# Patient Record
Sex: FEMALE | Race: WHITE | NOT HISPANIC OR LATINO
[De-identification: names, ages, dates, MRNs, and addresses within clinical notes are randomized per-mention and may not be internally consistent; named-entity substitution may affect disease eponyms.]

---

## 2017-07-25 PROBLEM — Z00.00 ENCOUNTER FOR PREVENTIVE HEALTH EXAMINATION: Status: ACTIVE | Noted: 2017-07-25

## 2017-08-01 ENCOUNTER — APPOINTMENT (OUTPATIENT)
Dept: GASTROENTEROLOGY | Facility: CLINIC | Age: 22
End: 2017-08-01

## 2018-01-16 ENCOUNTER — APPOINTMENT (OUTPATIENT)
Dept: INTERNAL MEDICINE | Facility: CLINIC | Age: 23
End: 2018-01-16

## 2019-03-04 ENCOUNTER — APPOINTMENT (OUTPATIENT)
Dept: PLASTIC SURGERY | Facility: CLINIC | Age: 24
End: 2019-03-04
Payer: COMMERCIAL

## 2019-03-04 VITALS — HEIGHT: 66 IN | WEIGHT: 110 LBS | BODY MASS INDEX: 17.68 KG/M2

## 2019-03-04 DIAGNOSIS — Z78.9 OTHER SPECIFIED HEALTH STATUS: ICD-10-CM

## 2019-03-04 DIAGNOSIS — E06.3 AUTOIMMUNE THYROIDITIS: ICD-10-CM

## 2019-03-04 DIAGNOSIS — D49.89 NEOPLASM OF UNSPECIFIED BEHAVIOR OF OTHER SPECIFIED SITES: ICD-10-CM

## 2019-03-04 DIAGNOSIS — K29.70 GASTRITIS, UNSPECIFIED, W/OUT BLEEDING: ICD-10-CM

## 2019-03-04 PROCEDURE — 99201 OFFICE OUTPATIENT NEW 10 MINUTES: CPT

## 2019-03-04 RX ORDER — BACILLUS COAGULANS/INULIN 1B-250 MG
CAPSULE ORAL
Refills: 0 | Status: ACTIVE | COMMUNITY

## 2019-03-04 NOTE — HISTORY OF PRESENT ILLNESS
[FreeTextEntry1] : 22 yo female presents for consultation for right upper extremity neoplasm.  Patient states she noticed the mass approximately 4 months ago.  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  She states the mass causes itchiness and discomfort.  She also feels a new mass on her upper left arm and the back of her left thigh.  She has not undergone any imaging or biopsy.  She has autoimmune thyroiditis, gastritis, and PCOS for which she takes probiotics and supplements.  SHe has a hx of cholecystectomy.  She does not smoke.

## 2019-03-04 NOTE — REASON FOR VISIT
[Initial Evaluation] : an initial evaluation [Parent] : parent [FreeTextEntry1] : Pt presents for consultation for right upper extremity neoplasm.

## 2019-03-04 NOTE — PHYSICAL EXAM
[NI] : Normal [de-identified] : right posterior upper extremity with deep, difficult to palpate round neoplasm

## 2019-03-11 ENCOUNTER — FORM ENCOUNTER (OUTPATIENT)
Age: 24
End: 2019-03-11

## 2019-03-12 ENCOUNTER — OUTPATIENT (OUTPATIENT)
Dept: OUTPATIENT SERVICES | Facility: HOSPITAL | Age: 24
LOS: 1 days | End: 2019-03-12
Payer: COMMERCIAL

## 2019-03-12 ENCOUNTER — APPOINTMENT (OUTPATIENT)
Dept: ULTRASOUND IMAGING | Facility: IMAGING CENTER | Age: 24
End: 2019-03-12
Payer: COMMERCIAL

## 2019-03-12 DIAGNOSIS — Z00.8 ENCOUNTER FOR OTHER GENERAL EXAMINATION: ICD-10-CM

## 2019-03-12 PROCEDURE — 76882 US LMTD JT/FCL EVL NVASC XTR: CPT | Mod: 26

## 2019-03-12 PROCEDURE — 76882 US LMTD JT/FCL EVL NVASC XTR: CPT

## 2021-07-29 ENCOUNTER — NON-APPOINTMENT (OUTPATIENT)
Age: 26
End: 2021-07-29

## 2021-07-29 ENCOUNTER — APPOINTMENT (OUTPATIENT)
Dept: OPHTHALMOLOGY | Facility: CLINIC | Age: 26
End: 2021-07-29
Payer: COMMERCIAL

## 2021-07-29 PROCEDURE — 92004 COMPRE OPH EXAM NEW PT 1/>: CPT

## 2022-07-31 ENCOUNTER — NON-APPOINTMENT (OUTPATIENT)
Age: 27
End: 2022-07-31

## 2023-04-24 ENCOUNTER — APPOINTMENT (OUTPATIENT)
Dept: OBGYN | Facility: CLINIC | Age: 28
End: 2023-04-24
Payer: MEDICAID

## 2023-04-24 VITALS
HEIGHT: 66 IN | BODY MASS INDEX: 20.25 KG/M2 | DIASTOLIC BLOOD PRESSURE: 58 MMHG | SYSTOLIC BLOOD PRESSURE: 100 MMHG | WEIGHT: 126 LBS

## 2023-04-24 PROCEDURE — 99203 OFFICE O/P NEW LOW 30 MIN: CPT

## 2023-04-24 NOTE — PLAN
[FreeTextEntry1] : BD Affirm\par Will contact with results and treat accordingly\par Given info on Vagibiom as she has had previous BV infections\par \par

## 2023-04-24 NOTE — HISTORY OF PRESENT ILLNESS
[Condoms] : uses condoms [Y] : Patient is sexually active [N] : Patient denies prior pregnancies [FreeTextEntry1] : 27 yo presents with hx of treatment for possible BV with Metrogel at a clinic in [place] 10 days ago.  Never received results and felt better for a while but feels recurrence of sx.   [LMPDate] : 3/9/23

## 2023-04-24 NOTE — PHYSICAL EXAM
[Appropriately responsive] : appropriately responsive [Alert] : alert [Oriented x3] : oriented x3 [Labia Majora] : normal [Labia Minora] : normal [Discharge] : a  ~M vaginal discharge was present [Scant] : scant [White] : white [Thin] : thin [Normal] : normal [Foul Smelling] : not foul smelling [FreeTextEntry4] : neg whiff test

## 2023-04-25 DIAGNOSIS — N76.0 ACUTE VAGINITIS: ICD-10-CM

## 2023-04-25 DIAGNOSIS — B96.89 ACUTE VAGINITIS: ICD-10-CM

## 2023-04-25 LAB
CANDIDA VAG CYTO: NOT DETECTED
G VAGINALIS+PREV SP MTYP VAG QL MICRO: DETECTED
T VAGINALIS VAG QL WET PREP: NOT DETECTED

## 2023-05-09 ENCOUNTER — RESULT CHARGE (OUTPATIENT)
Age: 28
End: 2023-05-09

## 2023-05-09 ENCOUNTER — APPOINTMENT (OUTPATIENT)
Dept: OBGYN | Facility: CLINIC | Age: 28
End: 2023-05-09
Payer: MEDICAID

## 2023-05-09 VITALS
DIASTOLIC BLOOD PRESSURE: 70 MMHG | HEIGHT: 66 IN | SYSTOLIC BLOOD PRESSURE: 110 MMHG | BODY MASS INDEX: 20.25 KG/M2 | WEIGHT: 126 LBS

## 2023-05-09 DIAGNOSIS — R30.0 DYSURIA: ICD-10-CM

## 2023-05-09 LAB
BILIRUB UR QL STRIP: NORMAL
CLARITY UR: CLEAR
COLLECTION METHOD: NORMAL
GLUCOSE UR-MCNC: NORMAL
HCG UR QL: 0.2 EU/DL
HGB UR QL STRIP.AUTO: NORMAL
KETONES UR-MCNC: NORMAL
LEUKOCYTE ESTERASE UR QL STRIP: ABNORMAL
NITRITE UR QL STRIP: NORMAL
PH UR STRIP: 7
PROT UR STRIP-MCNC: NORMAL
SP GR UR STRIP: 1

## 2023-05-09 PROCEDURE — 99211 OFF/OP EST MAY X REQ PHY/QHP: CPT

## 2023-05-09 NOTE — PLAN
[FreeTextEntry1] : Unremarkable pelvic exam and vaginal discharge not noted\par BD Affirm sent\par UA shows small leukocytes\par Urince culture sent\par Will treat based on lab results

## 2023-05-09 NOTE — HISTORY OF PRESENT ILLNESS
[FreeTextEntry1] : 29 yo P0 presents with hx of painful intercourse after completing treatment with Metronidazole for BV.  She also used Vagibiom for three consecutive days after Metronidazole course.  \par Reports sensation of rawness with penetration.  Denies vaginal itching or odor.\par Reports burning with urination but uncertain if is urinary or related to vaginal burning\par

## 2023-05-10 ENCOUNTER — TRANSCRIPTION ENCOUNTER (OUTPATIENT)
Age: 28
End: 2023-05-10

## 2023-05-12 LAB — BACTERIA UR CULT: ABNORMAL

## 2023-05-25 ENCOUNTER — APPOINTMENT (OUTPATIENT)
Dept: OBGYN | Facility: CLINIC | Age: 28
End: 2023-05-25
Payer: MEDICAID

## 2023-05-25 VITALS
HEIGHT: 66 IN | BODY MASS INDEX: 20.25 KG/M2 | WEIGHT: 126 LBS | SYSTOLIC BLOOD PRESSURE: 95 MMHG | DIASTOLIC BLOOD PRESSURE: 63 MMHG

## 2023-05-25 DIAGNOSIS — R82.71 BACTERIURIA: ICD-10-CM

## 2023-05-25 PROCEDURE — 99213 OFFICE O/P EST LOW 20 MIN: CPT

## 2023-05-25 NOTE — DISCUSSION/SUMMARY
[FreeTextEntry1] : 27 y/o with chronic BV\par Reviewed some Recurrent BV regimens with pt\par She is concerned about getting c difficile which she had once before so does not want to do p.o abx\par Plan is for her to use Metrogel x 7 nights f/b twice weekly for 6 months \par Sent Diflucan tablets as well incase she gets a yeast infection\par If this does not end the chronic BV we will send her to [facility]

## 2023-05-25 NOTE — HISTORY OF PRESENT ILLNESS
[FreeTextEntry1] : Pt here today desperate to get rid of her chronic BV. She reports she does 5 nights of Metrogel and no sooner finishes it than she gets the symptoms back. she is no longer having intercourse d/t symptoms. REviewed her lifestyle and cannot identify anything causing the issue\par She has been using Vagibiom as well to improve the ph and lactobacilli \par She also wants to know whether her GBS in her urine is gone. She has no UTI symptoms

## 2023-05-25 NOTE — PHYSICAL EXAM
[Labia Majora] : normal [Labia Minora] : normal [Normal] : normal [FreeTextEntry4] : nml appearing secretions

## 2023-05-30 ENCOUNTER — NON-APPOINTMENT (OUTPATIENT)
Age: 28
End: 2023-05-30

## 2023-05-31 LAB
A VAGINAE DNA VAG QL NAA+PROBE: NORMAL
BACTERIA UR CULT: NORMAL
BVAB2 DNA VAG QL NAA+PROBE: NORMAL
C KRUSEI DNA VAG QL NAA+PROBE: NEGATIVE
C TRACH RRNA SPEC QL NAA+PROBE: NEGATIVE
CANDIDA DNA VAG QL NAA+PROBE: NEGATIVE
MEGA1 DNA VAG QL NAA+PROBE: NORMAL
N GONORRHOEA RRNA SPEC QL NAA+PROBE: NEGATIVE
T VAGINALIS RRNA SPEC QL NAA+PROBE: NEGATIVE

## 2023-06-02 ENCOUNTER — APPOINTMENT (OUTPATIENT)
Dept: OBGYN | Facility: CLINIC | Age: 28
End: 2023-06-02

## 2023-06-02 ENCOUNTER — APPOINTMENT (OUTPATIENT)
Dept: OBGYN | Facility: CLINIC | Age: 28
End: 2023-06-02
Payer: MEDICAID

## 2023-06-02 VITALS
BODY MASS INDEX: 20.09 KG/M2 | HEIGHT: 66 IN | DIASTOLIC BLOOD PRESSURE: 73 MMHG | SYSTOLIC BLOOD PRESSURE: 107 MMHG | WEIGHT: 125 LBS

## 2023-06-02 PROCEDURE — 99213 OFFICE O/P EST LOW 20 MIN: CPT

## 2023-06-02 NOTE — PHYSICAL EXAM
[Labia Majora] : normal [Labia Minora] : normal [Normal] : normal [FreeTextEntry4] : yeast noted in vagina-small amount

## 2023-06-02 NOTE — DISCUSSION/SUMMARY
[FreeTextEntry1] : BD Affirm and urea/mycoplasma collected\par Sending Diflucan with a refill\par We discussed whether she should proceed with the long term treatment -Metrogel 2x a week for 6 months after the last test was negative. She is going away to [facility] for a couple of weeks and did note an adverse effect from the having alcohol when she was using Metrogel in the past. OK to wait until she returns to start long term therapy however if she has symptoms again than she needs a full five night course before doing the twice weekly maintainance. Finally she is asking for the name of an ID MD that specializes in this field-will locate one for her\par She is considering using a service on line with MDs that analyze your personal vaginal secretions and treat accordingly-wanted to know my opinion\par I advised her I am not sure about the service and cannot weigh in an opinion one way or the other since I am unfamiliar with them. I did tell her that runs of BV often run their course and resolve spontaneously but I understand her desperation to get this annoying issue to go away

## 2023-06-02 NOTE — HISTORY OF PRESENT ILLNESS
[FreeTextEntry1] : 29 y/o previously seen a week ago for BV with negative culture after we planned to do a long term treatment\par She returns today reporting that she now has vaginal itching and thinks she has yeast. She took 5 nights of Metrogel-last insert was Wednesday\par She also wants us to perform a urea/mycoplasma test today as well\par She is puzzled by the negative culture last week since she is sure she had the odor she always has with BV\par Two nights prior to the visit she had inserted a vaginal boric acid/probiotic which may have altered the results of the test\par

## 2023-06-05 LAB
BILIRUB UR QL STRIP: NORMAL
CANDIDA VAG CYTO: NOT DETECTED
CLARITY UR: CLEAR
COLLECTION METHOD: NORMAL
G VAGINALIS+PREV SP MTYP VAG QL MICRO: NOT DETECTED
GLUCOSE UR-MCNC: NORMAL
HCG UR QL: 0.2 EU/DL
HGB UR QL STRIP.AUTO: ABNORMAL
KETONES UR-MCNC: NORMAL
LEUKOCYTE ESTERASE UR QL STRIP: NORMAL
NITRITE UR QL STRIP: NORMAL
PH UR STRIP: 7
PROT UR STRIP-MCNC: NORMAL
SP GR UR STRIP: 1.01
T VAGINALIS VAG QL WET PREP: NOT DETECTED

## 2023-06-07 ENCOUNTER — NON-APPOINTMENT (OUTPATIENT)
Age: 28
End: 2023-06-07

## 2023-06-09 LAB
MYCOPLASMA HOMINIS CULTURE: NEGATIVE
UREAPLASMA CULTURE: NEGATIVE

## 2023-07-05 ENCOUNTER — NON-APPOINTMENT (OUTPATIENT)
Age: 28
End: 2023-07-05

## 2023-07-10 ENCOUNTER — APPOINTMENT (OUTPATIENT)
Dept: FAMILY MEDICINE | Facility: CLINIC | Age: 28
End: 2023-07-10

## 2023-07-11 ENCOUNTER — NON-APPOINTMENT (OUTPATIENT)
Age: 28
End: 2023-07-11

## 2023-07-26 ENCOUNTER — APPOINTMENT (OUTPATIENT)
Dept: OBGYN | Facility: CLINIC | Age: 28
End: 2023-07-26
Payer: MEDICAID

## 2023-07-26 VITALS — BODY MASS INDEX: 20.01 KG/M2 | WEIGHT: 124 LBS | SYSTOLIC BLOOD PRESSURE: 99 MMHG | DIASTOLIC BLOOD PRESSURE: 64 MMHG

## 2023-07-26 PROCEDURE — 99213 OFFICE O/P EST LOW 20 MIN: CPT

## 2023-07-26 NOTE — PLAN
[FreeTextEntry1] : Negative whiff test\par BD Affirm\par Referred to infectious disease Dr. Tai\par Rx Clindamycin vaginal gel in event BD Affirm is+ for BV

## 2023-07-26 NOTE — HISTORY OF PRESENT ILLNESS
[Patient reported PAP Smear was normal] : Patient reported PAP Smear was normal [Condoms] : uses condoms [Y] : Patient is sexually active [N] : Patient denies prior pregnancies [FreeTextEntry1] : 28 yo P0 presents with hx of symptoms (burning, itching, foul odor) of bacterial vaginosis since completion of last treatment with MetroGel.  SHe states that she has a pattern of sx returning one week after completion of treatment.    She was previously treated by me with metronidazole and recommended using Vagibiom which she did.   Previous BV on BD Affirm 4/22/23 and 5/9/23.  Negative on 6/2/23\par  [PapSmeardate] : 7/2022 [LMPDate] : 7/19/23

## 2023-07-26 NOTE — PHYSICAL EXAM
[Appropriately responsive] : appropriately responsive [Oriented x3] : oriented x3 [Labia Majora] : normal [Labia Minora] : normal [Normal] : normal [Discharge] : a  ~M vaginal discharge was present [Moderate] : moderate [White] : white [Watery] : watery [Uterine Adnexae] : normal [FreeTextEntry4] : thin grey discharge

## 2023-08-09 ENCOUNTER — APPOINTMENT (OUTPATIENT)
Dept: INFECTIOUS DISEASE | Facility: CLINIC | Age: 28
End: 2023-08-09

## 2023-09-15 ENCOUNTER — APPOINTMENT (OUTPATIENT)
Dept: OBGYN | Facility: CLINIC | Age: 28
End: 2023-09-15

## 2023-09-19 ENCOUNTER — LABORATORY RESULT (OUTPATIENT)
Age: 28
End: 2023-09-19

## 2023-09-20 ENCOUNTER — APPOINTMENT (OUTPATIENT)
Dept: OBGYN | Facility: CLINIC | Age: 28
End: 2023-09-20
Payer: MEDICAID

## 2023-09-20 VITALS
BODY MASS INDEX: 19.61 KG/M2 | DIASTOLIC BLOOD PRESSURE: 71 MMHG | HEIGHT: 66 IN | SYSTOLIC BLOOD PRESSURE: 106 MMHG | WEIGHT: 122 LBS

## 2023-09-20 DIAGNOSIS — E28.2 POLYCYSTIC OVARIAN SYNDROME: ICD-10-CM

## 2023-09-20 PROCEDURE — 99213 OFFICE O/P EST LOW 20 MIN: CPT

## 2023-10-11 ENCOUNTER — APPOINTMENT (OUTPATIENT)
Dept: OBGYN | Facility: CLINIC | Age: 28
End: 2023-10-11

## 2023-10-16 ENCOUNTER — APPOINTMENT (OUTPATIENT)
Dept: OBGYN | Facility: CLINIC | Age: 28
End: 2023-10-16
Payer: MEDICAID

## 2023-10-16 VITALS
DIASTOLIC BLOOD PRESSURE: 67 MMHG | HEIGHT: 66 IN | SYSTOLIC BLOOD PRESSURE: 109 MMHG | BODY MASS INDEX: 19.61 KG/M2 | WEIGHT: 122 LBS

## 2023-10-16 PROCEDURE — 99212 OFFICE O/P EST SF 10 MIN: CPT

## 2023-10-18 LAB — S PYO DNA THROAT QL NAA+PROBE: NOT DETECTED

## 2023-10-19 ENCOUNTER — NON-APPOINTMENT (OUTPATIENT)
Age: 28
End: 2023-10-19

## 2023-10-20 ENCOUNTER — APPOINTMENT (OUTPATIENT)
Dept: OBGYN | Facility: CLINIC | Age: 28
End: 2023-10-20

## 2024-01-24 ENCOUNTER — NON-APPOINTMENT (OUTPATIENT)
Age: 29
End: 2024-01-24

## 2024-01-29 ENCOUNTER — APPOINTMENT (OUTPATIENT)
Dept: ORTHOPEDIC SURGERY | Facility: CLINIC | Age: 29
End: 2024-01-29

## 2024-04-01 RX ORDER — CLINDAMYCIN PHOSPHATE 20 MG/G
2 CREAM VAGINAL
Qty: 1 | Refills: 1 | Status: COMPLETED | COMMUNITY
Start: 2023-07-26 | End: 2024-04-01

## 2024-04-01 RX ORDER — METRONIDAZOLE 7.5 MG/G
0.75 GEL VAGINAL
Qty: 1 | Refills: 1 | Status: COMPLETED | COMMUNITY
Start: 2023-04-25 | End: 2024-04-01

## 2024-04-01 RX ORDER — TINIDAZOLE 500 MG/1
500 TABLET, FILM COATED ORAL DAILY
Qty: 10 | Refills: 1 | Status: COMPLETED | COMMUNITY
Start: 2023-05-10 | End: 2024-04-01

## 2024-04-01 RX ORDER — FLUCONAZOLE 150 MG/1
150 TABLET ORAL
Qty: 2 | Refills: 1 | Status: COMPLETED | COMMUNITY
Start: 2023-06-02 | End: 2024-04-01

## 2024-04-01 RX ORDER — METRONIDAZOLE 7.5 MG/G
0.75 GEL VAGINAL
Qty: 3 | Refills: 1 | Status: COMPLETED | COMMUNITY
Start: 2023-05-25 | End: 2024-04-01

## 2024-04-01 RX ORDER — METRONIDAZOLE 7.5 MG/G
0.75 GEL VAGINAL
Qty: 1 | Refills: 1 | Status: COMPLETED | COMMUNITY
Start: 2023-05-10 | End: 2024-04-01

## 2024-04-01 RX ORDER — AMOXICILLIN 500 MG/1
500 TABLET, FILM COATED ORAL
Qty: 14 | Refills: 0 | Status: COMPLETED | COMMUNITY
Start: 2023-05-12 | End: 2024-04-01

## 2024-04-01 RX ORDER — FLUCONAZOLE 150 MG/1
150 TABLET ORAL
Qty: 3 | Refills: 1 | Status: COMPLETED | COMMUNITY
Start: 2023-05-25 | End: 2024-04-01

## 2024-04-05 ENCOUNTER — APPOINTMENT (OUTPATIENT)
Dept: OBGYN | Facility: CLINIC | Age: 29
End: 2024-04-05
Payer: MEDICAID

## 2024-04-05 VITALS — BODY MASS INDEX: 20.18 KG/M2 | SYSTOLIC BLOOD PRESSURE: 90 MMHG | WEIGHT: 125 LBS | DIASTOLIC BLOOD PRESSURE: 60 MMHG

## 2024-04-05 DIAGNOSIS — N89.8 OTHER SPECIFIED NONINFLAMMATORY DISORDERS OF VAGINA: ICD-10-CM

## 2024-04-05 PROCEDURE — 56820 COLPOSCOPY VULVA: CPT

## 2024-04-05 PROCEDURE — 99204 OFFICE O/P NEW MOD 45 MIN: CPT | Mod: 25

## 2024-04-05 PROCEDURE — 87210 SMEAR WET MOUNT SALINE/INK: CPT | Mod: QW

## 2024-04-05 NOTE — ASSESSMENT
[TextEntry] : For the past year, the patient has been complaining of recurrent vaginal discharge, with odor and itching.  She was told that she had BV and treated on multiple occasions with MetroGel and later with clindamycin cream.  Today there is no evidence of Candida (pending culture) or BV by Amsel's criteria. 46  minutes of total time was spent on the date of the encounter. This included time for review of medical records and laboratory results, face to face time (including the physical examination counseling and coordination of care), time for patient education, treatment plans, answering questions, communicating with other doctors and for medical record documentation.  The time spent is separate from time spent on separately billed procedures.

## 2024-04-05 NOTE — PHYSICAL EXAM
[Chaperone Present] : A chaperone was present in the examining room during all aspects of the physical examination [TextEntry] : ***General*** General Appearance: normal; Judgment and insight: normal; the patient is oriented to time, place and person; Recent and remote memory: normal; Mood and affect: normal; Respiratory effort: normal.  ***Pelvic Examination*** External genitalia: the appearance and hair distribution are normal; no lesions are appreciated. Urethra/urethral meatus: no masses or tenderness are appreciated; there is no scarring noted. Bladder: nontender; there is no fullness appreciated; no masses were palpated. Vagina: the vagina is normally rugated; a white discharge is seen; no lesions are seen; pelvic support is adequate without any evidence of cystocele or rectocele. Cervix: normal in appearance; no overt lesions are seen. Uterus: Anteverted; normal in size, mobile, nontender, and well supported. Adnexa/parametria: nontender and not enlarged; no masses are palpated. A stool specimen was not indicated at this time.   ***Vaginal Discharge Evaluation*** A saline wet mount and KOH slide evaluation of the vaginal discharge were done. The discharge was white; the pH was intermediate; the whiff test was negative; clue cells were not seen; hyphae were not seen; a few lactobacilli were seen; no white blood cells were seen; superficial cells were seen; a candida culture was sent.   ***colposcopy of the vulva*** Colposcopy of the external genitalia showed that the labia majora and labia minora were normal. She identified the introitus as the location of her pain/itching/irritation. There was 7/10 vestibular tenderness of the anterior minor vestibular glands, and 7/10 vestibular tenderness of the posterior minor vestibular glands. There was no evidence of other dermatopathology. There was no erythema of the introitus.

## 2024-04-05 NOTE — PLAN
[FreeTextEntry1] : I recommended that she call for the results of her yeast culture in 1 week.  I reassured the patient that she does not have BV and recommended that she return if her symptoms become more prominent.

## 2024-04-16 ENCOUNTER — APPOINTMENT (OUTPATIENT)
Dept: OBGYN | Facility: CLINIC | Age: 29
End: 2024-04-16
Payer: MEDICAID

## 2024-04-16 DIAGNOSIS — N94.810 VULVAR VESTIBULITIS: ICD-10-CM

## 2024-04-16 DIAGNOSIS — N89.8 OTHER SPECIFIED NONINFLAMMATORY DISORDERS OF VAGINA: ICD-10-CM

## 2024-04-16 PROCEDURE — 56820 COLPOSCOPY VULVA: CPT

## 2024-04-16 PROCEDURE — 87210 SMEAR WET MOUNT SALINE/INK: CPT | Mod: QW

## 2024-04-16 PROCEDURE — 99213 OFFICE O/P EST LOW 20 MIN: CPT | Mod: 25

## 2024-04-16 NOTE — ASSESSMENT
[TextEntry] : For the past year, the patient has had what she was told was recurrent BV.  At her last visit, she did not have Candida by culture or BV by Amsel's criteria.  Now she feels that her discharge and odor are closer to the symptoms that she had over the past year and she has mild itchiness.  There is no evidence of Candida or BV (by Amsel's criteria).  Consideration is given to the possibility that this is a physiologic discharge and that the patient has lost a sense of what is normal for her. 23  minutes of total time was spent on the date of the encounter. This included time for review of medical records and laboratory results, face to face time (including the physical examination counseling and coordination of care), time for patient education, treatment plans, answering questions, communicating with other doctors and for medical record documentation.  The time spent is separate from time spent on separately billed procedures.

## 2024-04-16 NOTE — HISTORY OF PRESENT ILLNESS
[FreeTextEntry1] : For the past year, the patient has been told that she has recurrent/persistent BV.  At her last visit 10 days ago, there is no evidence of Candida on culture or BV on wet prep (by Amsel's criteria)..  She is now complaining of a watery vaginal discharge with slight itching and a vinegary/fishy odor.  The patient has PCOS and has irregular menses but her last menstrual period was March 26 (21 days ago).

## 2024-04-16 NOTE — PHYSICAL EXAM
[Chaperone Present] : A chaperone was present in the examining room during all aspects of the physical examination [TextEntry] : ***General*** General Appearance: normal; Judgment and insight: normal; the patient is oriented to time, place and person; Recent and remote memory: normal; Mood and affect: normal; Respiratory effort: normal.  ***Pelvic Examination*** External genitalia: the appearance and hair distribution are normal; no lesions are appreciated. Urethra/urethral meatus: no masses or tenderness are appreciated; there is no scarring noted. Bladder: nontender; there is no fullness appreciated; no masses were palpated. Vagina: the vagina is normally rugated; a white discharge is seen; no lesions are seen; pelvic support is adequate without any evidence of cystocele or rectocele. Cervix: normal in appearance; no overt lesions are seen. Uterus: Anteverted; normal in size, mobile, nontender, and well supported. Adnexa/parametria: nontender and not enlarged; no masses are palpated. A stool specimen was not indicated at this time.  ***Vaginal Discharge Evaluation*** A saline wet mount and KOH slide evaluation of the vaginal discharge were done. The discharge was white; the pH was normal; the whiff test was negative; clue cells were not seen; hyphae were not seen; a moderate number of lactobacilli were seen; no white blood cells were seen; superficial cells were seen; a candida culture was sent.  ***colposcopy of the vulva*** Colposcopy of the external genitalia showed that the labia majora and labia minora were normal. She identified the introitus as the location of her pain/itching/irritation. There was 7/10 vestibular tenderness of the anterior minor vestibular glands, and no vestibular tenderness of the posterior minor vestibular glands. There was no evidence of other dermatopathology. There was mild erythema of the introitus.

## 2024-04-16 NOTE — PLAN
[FreeTextEntry1] : I recommended that she call for the results of her yeast culture in 1 week.  I recommended that she return if her symptoms are more prominent and reminiscent of the symptoms that she has had over the past year.  I offered her the option of oral contraceptives to diminish cervical mucus discharge but she declined.  I encouraged her to consider resetting her expectation of what is a normal discharge.

## 2024-05-14 ENCOUNTER — APPOINTMENT (OUTPATIENT)
Dept: OBGYN | Facility: CLINIC | Age: 29
End: 2024-05-14
Payer: MEDICAID

## 2024-05-14 VITALS
SYSTOLIC BLOOD PRESSURE: 107 MMHG | BODY MASS INDEX: 20.09 KG/M2 | WEIGHT: 125 LBS | DIASTOLIC BLOOD PRESSURE: 70 MMHG | HEIGHT: 66 IN

## 2024-05-14 DIAGNOSIS — N76.0 ACUTE VAGINITIS: ICD-10-CM

## 2024-05-14 DIAGNOSIS — N92.6 IRREGULAR MENSTRUATION, UNSPECIFIED: ICD-10-CM

## 2024-05-14 DIAGNOSIS — B96.89 ACUTE VAGINITIS: ICD-10-CM

## 2024-05-14 LAB
BILIRUB UR QL STRIP: NORMAL
CLARITY UR: CLEAR
COLLECTION METHOD: NORMAL
GLUCOSE UR-MCNC: NORMAL
HCG UR QL: 0.2 EU/DL
HGB UR QL STRIP.AUTO: ABNORMAL
KETONES UR-MCNC: NORMAL
LEUKOCYTE ESTERASE UR QL STRIP: NORMAL
NITRITE UR QL STRIP: NORMAL
PH UR STRIP: 7
PROT UR STRIP-MCNC: NORMAL
SP GR UR STRIP: 1.01

## 2024-05-14 PROCEDURE — 99213 OFFICE O/P EST LOW 20 MIN: CPT

## 2024-05-14 NOTE — DISCUSSION/SUMMARY
[FreeTextEntry1] : Vaginitis Panel sent PCOS labs and Estradiol labs provided to patient who would like to do at an outside lab Will follow up with any abnormal results

## 2024-05-14 NOTE — PHYSICAL EXAM
[Appropriately responsive] : appropriately responsive [Alert] : alert [No Acute Distress] : no acute distress [Oriented x3] : oriented x3 [Labia Majora] : normal [Labia Minora] : normal [Discharge] : discharge [Scant] : scant [Foul Smelling] : not foul smelling [White] : white [Normal] : normal [Uterine Adnexae] : normal

## 2024-05-14 NOTE — HISTORY OF PRESENT ILLNESS
[FreeTextEntry1] : 30 yo G0 presents with history of chronic BV and has been treated previously with Metrogel, Clindamycin gel and probiotics.  Hx PCOS and C. Diff.  Was symptomatic and has been using an online products called [employer] and feels symptoms partially resolved.   She reports vaginal dryness and feels this may be related to low estrogen and request orders for PCOS labs Was told these should be redrawn every 6 months.

## 2025-02-20 NOTE — PLAN
[FreeTextEntry1] : Negative whiff test\par BD Affirm\par Referred to infectious disease Dr. Tai\par Rx Clindamycin vaginal gel in event BD Affirm is+ for BV 20-Feb-2025